# Patient Record
Sex: MALE | Race: BLACK OR AFRICAN AMERICAN | NOT HISPANIC OR LATINO | URBAN - METROPOLITAN AREA
[De-identification: names, ages, dates, MRNs, and addresses within clinical notes are randomized per-mention and may not be internally consistent; named-entity substitution may affect disease eponyms.]

---

## 2022-04-30 ENCOUNTER — TELEPHONE ENCOUNTER (OUTPATIENT)
Dept: URBAN - METROPOLITAN AREA CLINIC 121 | Facility: CLINIC | Age: 72
End: 2022-04-30

## 2022-05-01 ENCOUNTER — TELEPHONE ENCOUNTER (OUTPATIENT)
Dept: URBAN - METROPOLITAN AREA CLINIC 121 | Facility: CLINIC | Age: 72
End: 2022-05-01

## 2022-05-01 RX ORDER — CHLORDIAZEPOXIDE HYDROCHLORIDE AND CLIDINIUM BROMIDE 5; 2.5 MG/1; MG/1
1 CAPSULE PO TID PRN CAPSULE ORAL
Status: ACTIVE | COMMUNITY
Start: 2014-11-12

## 2022-05-01 RX ORDER — ZOLPIDEM TARTRATE 10 MG
1 TABLET PO QHS TABLET ORAL
Status: ACTIVE | COMMUNITY
Start: 2014-11-12

## 2022-05-01 RX ORDER — DICYCLOMINE HYDROCHLORIDE 10 MG/1
TAKE 1 CAPSULE BY MOUTH 3 TIMES DAILY 30 MINUTES BEFORE MEALS CAPSULE ORAL
Status: ACTIVE | COMMUNITY
Start: 2013-03-27

## 2022-05-01 RX ORDER — OMEPRAZOLE 40 MG/1
TAKE1 CAPSULE BY MOUTH  EVERY MORNING 45 MINS BEFORE BREAKFAST CAPSULE, DELAYED RELEASE ORAL
Status: ACTIVE | COMMUNITY
Start: 2013-05-15

## 2023-12-08 ENCOUNTER — DASHBOARD ENCOUNTERS (OUTPATIENT)
Age: 73
End: 2023-12-08

## 2023-12-08 ENCOUNTER — OFFICE VISIT (OUTPATIENT)
Dept: URBAN - METROPOLITAN AREA CLINIC 29 | Facility: CLINIC | Age: 73
End: 2023-12-08
Payer: MEDICARE

## 2023-12-08 VITALS
WEIGHT: 188 LBS | BODY MASS INDEX: 28.49 KG/M2 | DIASTOLIC BLOOD PRESSURE: 103 MMHG | HEIGHT: 68 IN | SYSTOLIC BLOOD PRESSURE: 173 MMHG | HEART RATE: 69 BPM

## 2023-12-08 DIAGNOSIS — Z12.11 ENCOUNTER FOR SCREENING FOR MALIGNANT NEOPLASM OF COLON: ICD-10-CM

## 2023-12-08 PROCEDURE — 99203 OFFICE O/P NEW LOW 30 MIN: CPT | Performed by: INTERNAL MEDICINE

## 2023-12-08 RX ORDER — SOD SULF/POT CHLORIDE/MAG SULF 1.479 G
12 TABLETS THE FIRST DOSE THE EVENING BEFORE AND SECOND DOSE THE MORNING OF COLONOSCOPY TABLET ORAL TWICE A DAY
Qty: 12 TABLET | OUTPATIENT
Start: 2023-12-10 | End: 2023-12-11

## 2023-12-08 RX ORDER — ZOLPIDEM TARTRATE 10 MG
1 TABLET PO QHS TABLET ORAL
Status: ACTIVE | COMMUNITY
Start: 2014-11-12

## 2023-12-08 RX ORDER — OMEPRAZOLE 40 MG/1
TAKE1 CAPSULE BY MOUTH  EVERY MORNING 45 MINS BEFORE BREAKFAST CAPSULE, DELAYED RELEASE ORAL
Status: ACTIVE | COMMUNITY
Start: 2013-05-15

## 2023-12-08 RX ORDER — DICYCLOMINE HYDROCHLORIDE 10 MG/1
TAKE 1 CAPSULE BY MOUTH 3 TIMES DAILY 30 MINUTES BEFORE MEALS CAPSULE ORAL
Status: ACTIVE | COMMUNITY
Start: 2013-03-27

## 2023-12-08 RX ORDER — CHLORDIAZEPOXIDE HYDROCHLORIDE AND CLIDINIUM BROMIDE 5; 2.5 MG/1; MG/1
1 CAPSULE PO TID PRN CAPSULE ORAL
Status: ACTIVE | COMMUNITY
Start: 2014-11-12

## 2023-12-08 NOTE — HPI-TODAY'S VISIT:
Mr. Rose presents today for colorectal cancer screening.  He had a normal colonoscopy with me in 2013.  He denies any family history of colon cancer or polyps.  He denies any gastrointestinal or medical issues.

## 2023-12-10 ENCOUNTER — LAB OUTSIDE AN ENCOUNTER (OUTPATIENT)
Dept: URBAN - METROPOLITAN AREA CLINIC 29 | Facility: CLINIC | Age: 73
End: 2023-12-10

## 2023-12-28 ENCOUNTER — OFFICE VISIT (OUTPATIENT)
Dept: URBAN - METROPOLITAN AREA SURGERY CENTER 7 | Facility: SURGERY CENTER | Age: 73
End: 2023-12-28

## 2025-08-06 ENCOUNTER — TELEPHONE ENCOUNTER (OUTPATIENT)
Dept: URBAN - METROPOLITAN AREA CLINIC 29 | Facility: CLINIC | Age: 75
End: 2025-08-06

## 2025-08-14 ENCOUNTER — TELEPHONE ENCOUNTER (OUTPATIENT)
Dept: URBAN - METROPOLITAN AREA CLINIC 29 | Facility: CLINIC | Age: 75
End: 2025-08-14